# Patient Record
Sex: MALE | Race: WHITE | Employment: OTHER | URBAN - METROPOLITAN AREA
[De-identification: names, ages, dates, MRNs, and addresses within clinical notes are randomized per-mention and may not be internally consistent; named-entity substitution may affect disease eponyms.]

---

## 2017-07-16 ENCOUNTER — HOSPITAL ENCOUNTER (EMERGENCY)
Facility: CLINIC | Age: 47
Discharge: HOME | End: 2017-07-16
Attending: EMERGENCY MEDICINE | Admitting: EMERGENCY MEDICINE

## 2017-07-16 ENCOUNTER — APPOINTMENT (OUTPATIENT)
Dept: CT IMAGING | Facility: CLINIC | Age: 47
End: 2017-07-16
Attending: EMERGENCY MEDICINE

## 2017-07-16 VITALS
HEART RATE: 86 BPM | DIASTOLIC BLOOD PRESSURE: 97 MMHG | RESPIRATION RATE: 16 BRPM | WEIGHT: 174 LBS | OXYGEN SATURATION: 99 % | SYSTOLIC BLOOD PRESSURE: 167 MMHG | TEMPERATURE: 98 F | HEIGHT: 72 IN | BODY MASS INDEX: 23.57 KG/M2

## 2017-07-16 DIAGNOSIS — N20.1 CALCULUS OF URETER: ICD-10-CM

## 2017-07-16 DIAGNOSIS — R39.15 URINARY URGENCY: ICD-10-CM

## 2017-07-16 LAB
ALBUMIN UR-MCNC: 10 MG/DL
APPEARANCE UR: ABNORMAL
BILIRUB UR QL STRIP: NEGATIVE
COLOR UR AUTO: YELLOW
GLUCOSE UR STRIP-MCNC: NEGATIVE MG/DL
HGB UR QL STRIP: ABNORMAL
KETONES UR STRIP-MCNC: NEGATIVE MG/DL
LEUKOCYTE ESTERASE UR QL STRIP: NEGATIVE
MUCOUS THREADS #/AREA URNS LPF: PRESENT /LPF
NITRATE UR QL: NEGATIVE
PH UR STRIP: 7.5 PH (ref 5–7)
RBC #/AREA URNS AUTO: 20 /HPF (ref 0–2)
SP GR UR STRIP: 1.01 (ref 1–1.03)
SQUAMOUS #/AREA URNS AUTO: <1 /HPF (ref 0–1)
URN SPEC COLLECT METH UR: ABNORMAL
UROBILINOGEN UR STRIP-MCNC: NORMAL MG/DL (ref 0–2)
WBC #/AREA URNS AUTO: 0 /HPF (ref 0–2)

## 2017-07-16 PROCEDURE — 87086 URINE CULTURE/COLONY COUNT: CPT | Performed by: EMERGENCY MEDICINE

## 2017-07-16 PROCEDURE — 87591 N.GONORRHOEAE DNA AMP PROB: CPT | Performed by: EMERGENCY MEDICINE

## 2017-07-16 PROCEDURE — 87491 CHLMYD TRACH DNA AMP PROBE: CPT | Performed by: EMERGENCY MEDICINE

## 2017-07-16 PROCEDURE — 81001 URINALYSIS AUTO W/SCOPE: CPT | Performed by: EMERGENCY MEDICINE

## 2017-07-16 PROCEDURE — 74176 CT ABD & PELVIS W/O CONTRAST: CPT

## 2017-07-16 PROCEDURE — 99285 EMERGENCY DEPT VISIT HI MDM: CPT | Mod: 25

## 2017-07-16 RX ORDER — OXYCODONE AND ACETAMINOPHEN 5; 325 MG/1; MG/1
1 TABLET ORAL EVERY 4 HOURS PRN
Qty: 20 TABLET | Refills: 0 | Status: SHIPPED | OUTPATIENT
Start: 2017-07-16 | End: 2018-02-05

## 2017-07-16 RX ORDER — TAMSULOSIN HYDROCHLORIDE 0.4 MG/1
0.4 CAPSULE ORAL DAILY
Qty: 10 CAPSULE | Refills: 0 | Status: SHIPPED | OUTPATIENT
Start: 2017-07-16 | End: 2017-07-26

## 2017-07-16 RX ORDER — TAMSULOSIN HYDROCHLORIDE 0.4 MG/1
0.4 CAPSULE ORAL ONCE
Status: DISCONTINUED | OUTPATIENT
Start: 2017-07-16 | End: 2017-07-16 | Stop reason: HOSPADM

## 2017-07-16 RX ORDER — PHENAZOPYRIDINE HYDROCHLORIDE 100 MG/1
100 TABLET, FILM COATED ORAL 3 TIMES DAILY
Qty: 9 TABLET | Refills: 0 | Status: SHIPPED | OUTPATIENT
Start: 2017-07-16 | End: 2017-07-19

## 2017-07-16 RX ORDER — PHENAZOPYRIDINE HYDROCHLORIDE 100 MG/1
100 TABLET, FILM COATED ORAL ONCE
Status: DISCONTINUED | OUTPATIENT
Start: 2017-07-16 | End: 2017-07-16 | Stop reason: HOSPADM

## 2017-07-16 ASSESSMENT — ENCOUNTER SYMPTOMS
ABDOMINAL PAIN: 1
FREQUENCY: 1
FEVER: 0
DYSURIA: 1
FLANK PAIN: 0

## 2017-07-16 NOTE — ED AVS SNAPSHOT
Emergency Department    [address] [place] [address]    Phone:  [phone]    Fax:  [phone]                                       Obey Richardson   MRN: [id number]    Department:   Emergency Department   Date of Visit:  7/16/2017           After Visit Summary Signature Page     I have received my discharge instructions, and my questions have been answered. I have discussed any challenges I see with this plan with the nurse or doctor.    ..........................................................................................................................................  Patient/Patient Representative Signature      ..........................................................................................................................................  Patient Representative Print Name and Relationship to Patient    ..................................................               ................................................  Date                                            Time    ..........................................................................................................................................  Reviewed by Signature/Title    ...................................................              ..............................................  Date                                                            Time

## 2017-07-16 NOTE — ED PROVIDER NOTES
History     Chief Complaint:  Penile pain    HPI   Obey Richardson is a 46 year old male who presents to the Emergency Department for evaluation of penile pain. The patient reports the onset of penile pain two days ago with associated dysuria, frequent urination and lower abdominal pain (only present while urinating). Additionally he notes to have redness to the tip of his penis. The patient denies any fever, discharge, flank pain, testicular pain or swelling. 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 Patient state he is not currently sexually active.    Allergies:  No known drug allergies    Medications:    The patient is not currently taking any prescribed medications.    Past Medical History:    The patient denies any significant past medical history.    Past Surgical History:    The patient does not have any pertinent past surgical history.    Family History:    No past pertinent family history.    Social History:  Smoking Status: never smoker  Alcohol Use: no     Review of Systems   Constitutional: Negative for fever.   Gastrointestinal: Positive for abdominal pain.   Genitourinary: Positive for dysuria, frequency and penile pain. Negative for discharge, flank pain, scrotal swelling and testicular pain.   All other systems reviewed and are negative.    Physical Exam   First Vitals:  BP: (!) 167/97  Pulse: 86  Temp: 98  F (36.7  C)  Resp: 16  Height: 182.9 cm (6')  Weight: 78.9 kg (174 lb)  SpO2: 99 %      Physical Exam  General/Appearance: appears stated age, well-groomed, appears comfortable  Eyes: EOMI, no scleral injection, no icterus  ENT: MMM  Neck: supple, nl ROM, no stiffness  Cardiovascular: RRR, nl S1S2, no m/r/g, 2+ pulses in all 4 extremities, cap refill <2sec  Respiratory: CTAB, good air movement throughout, no wheezes/rhonchi/rales, no increased WOB, no retractions  Back: no lesions  GI: abd soft, non-distended, nttp,  no HSM, no rebound, no guarding, nl BS  : nl  bilateral testicular lie, no testicular swelling/lesions/ttp/masses. No penile lesions or purulent drainage.  MSK: CORNELIUS, good tone, no bony abnormality  Skin: warm and well-perfused, no rash, no edema, no ecchymosis, nl turgor  Neuro: GCS 15, alert and oriented, no gross focal neuro deficits  Psych: interacts appropriately  Heme: no petechia, no purpura, no active bleeding        Emergency Department Course     Imaging:  Radiographic findings were communicated with the patient who voiced understanding of the findings.    ABD/Pelvis CT without contrast:  There are two obstructing calculi in the distal left  ureter near the ureterovesical junction measuring 0.6 and 0.2 cm  respectively. This results in moderate hydronephrosis. There are  additional small non obstructing calculi elsewhere in both kidneys. As per radiology.     Laboratory:  UA reflex to Microscopic: trace urine blood, pH 7.5(H), protein albumin 10, RBC 20(H), mucous present, o/w WNL.    Neisseria gonorrhoea: In process  Chlamydia trachomatis: In process    Emergency Department Course:  Nursing notes and vitals reviewed. I performed an exam of the patient as documented above.     The patient provided a urine sample here in the emergency department. This was sent for laboratory testing, findings above.    The patient had a genital exam performed here in the emergency department, which he tolerated without difficulty. This was done in the presence of a male chaperone.    1307 I reassessed the patient. Will do a stone CT.     The patient was sent for a abd/pelvis ct no contrast while here in the emergency department, findings above.    1420 I reassessed the patient. Discussed results of CT imaging.     Findings and plan explained to the Patient. Patient discharged home with instructions regarding supportive care, medications, and reasons to return. The importance of close follow-up was reviewed.     Impression & Plan      Medical Decision Making:  This  patient is a 46-year-old male who presents for several days of urinary symptoms. He denies flank pain, nausea vomiting, fevers, other infectious signs. As his urinalysis did not show any signs of infection we pursued a CT to look for possible stones that were causing pain that radiated to the groin. His CT did find 2 stones near the left UVJ junction that both explain his hematuria and I expect explain his dysuria as well as urethral meatus pain. It's possible that he is already passed a stone that initially caused the redness and meatus pain. At this point a urine culture has been sent but given the lack of other concerning findings I don't think antibiotics need to be started. He'll be started on Flomax, pain meds, Pyridium to help him control his symptoms associated with the stones. We discussed his need to return to the ED if he develops fevers, worsening pain, inability to tolerate the pain. Otherwise he needs to strain his urine and follow up with urology in 3-5 days if he still having symptoms and has not passed the stones.  Diagnosis:    ICD-10-CM    1. Calculus of ureter N20.1 Urine Culture Aerobic Bacterial   2. Urinary urgency R39.15      Disposition:  discharged to home  Discharge Medications:  Discharge Medication List as of 7/16/2017  2:36 PM      START taking these medications    Details   tamsulosin (FLOMAX) 0.4 MG capsule Take 1 capsule (0.4 mg) by mouth daily for 10 doses, Disp-10 capsule, R-0, Local Print      oxyCODONE-acetaminophen (PERCOCET) 5-325 MG per tablet Take 1 tablet by mouth every 4 hours as needed for pain, Disp-20 tablet, R-0, Local Print      phenazopyridine (PYRIDIUM) 100 MG tablet Take 1 tablet (100 mg) by mouth 3 times daily for 3 days, Disp-9 tablet, R-0, Local Print             Aliza GARCIA, am serving as a scribe on 7/16/2017 at 12:55 PM to personally document services performed by Miriam Cross* based on my observations and the provider's statements to me.      Aliza Rivera  7/16/2017    EMERGENCY DEPARTMENT       Miriam Cross MD  07/16/17 5111

## 2017-07-16 NOTE — ED AVS SNAPSHOT
Emergency Department    [address] [facility] [address]    Phone:  [phone]    Fax:  [phone]                                       Obey Richardson   MRN: [id number]    Department:   Emergency Department   Date of Visit:  7/16/2017           Patient Information     Date Of Birth          1970        Your diagnoses for this visit were:     Calculus of ureter     Urinary urgency        You were seen by Miriam Cross MD.      Follow-up Information     Follow up with [facility]    Why:  As needed, If symptoms worsen    Contact information:    [address]  [facility] [id number] [phone]          Follow up with  Emergency Department.    Specialty:  EMERGENCY MEDICINE    Why:  As needed, If symptoms worsen    Contact information:    [address] [facility] [address] [phone]        Discharge Instructions         Discharge Instructions  Kidney Stones    Kidney stones are a common problem that can cause a lot of pain but fortunately are usually not dangerous and can be generally treated with medicine at home.  However, sometimes your condition may be worse than it seemed at first, or may get worse with time.     You need to follow-up with your regular doctor within 3 days.    Most kidney stones will pass on their own, but occasionally stones may need to be removed by an urologist. We will send you home with a urine strainer. Be sure to urinate into this, or urinate into a container and pour the urine through the fine filter to catch the kidney stone as it comes out. The stone will seem like a pebble or grain of sand. Be sure to save this in a Ziploc  bag and take it to the doctor s office with you.       Return to the Emergency Department if:    Your pain is not controlled.    You are vomiting and can t keep fluids or medications down.    You develop fever (>101).    You feel much more ill or develop new symptoms.  What can  I do to help myself?    Be sure to drink plenty of fluids.    Staying active is good, and may help the stone to pass. You may do whatever you feel up to doing without restrictions.   Treatment:    Non-steroidal anti-inflammatory drugs (NSAIDs). This includes prescription medicines like Toradol  (ketorolac) and non-prescription medicines like Advil  (ibuprofen) and Nuprin  (ibuprofen). These pain relievers are very effective for kidney stones.    Narcotic pain pills. If you have been given a narcotic such as Vicodin  (hydrocodone with acetaminophen), Percocet  (oxycodone with acetaminophen), or codeine, do not drive for four hours after you have taken it. If the narcotic contains Tylenol  (acetaminophen), do not take Tylenol  with it. All narcotics will cause constipation, so eat a high fiber diet.      Nausea medication.  Nausea and vomiting are common with kidney stones, so your physician may send you home with medicine for this.     Flomax  (tamsulosin). This medicine is sometimes used for men with prostate problems, but also can help kidney stones to pass. This medicine can lower blood pressure, and you may feel faint, especially when you first stand up. Be sure to get up gradually, sit down if you feel faint, and avoid activity where feeling faint would be dangerous, such as climbing ladders.   If you were given a prescription for medicine here today, be sure to read all of the information (including the package insert) that comes with your prescription.  This will include important information about the medicine, its side effects, and any warnings that you need to know about.  The pharmacist who fills the prescription can provide more information and answer questions you may have about the medicine.  If you have questions or concerns that the pharmacist cannot address, please call or return to the Emergency Department.   Opioid Medication Information    Pain medications are among the most commonly prescribed  medicines, so we are including this information for all our patients. If you did not receive pain medication or get a prescription for pain medicine, you can ignore it.     You may have been given a prescription for an opioid (narcotic) pain medicine and/or have received a pain medicine while here in the Emergency Department. These medicines can make you drowsy or impaired. You must not drive, operate dangerous equipment, or engage in any other dangerous activities while taking these medications. If you drive while taking these medications, you could be arrested for DUI, or driving under the influence. Do not drink any alcohol while you are taking these medications.     Opioid pain medications can cause addiction. If you have a history of chemical dependency of any type, you are at a higher risk of becoming addicted to pain medications.  Only take these prescribed medications to treat your pain when all other options have been tried. Take it for as short a time and as few doses as possible. Store your pain pills in a secure place, as they are frequently stolen and provide a dangerous opportunity for children or visitors in your house to start abusing these powerful medications. We will not replace any lost or stolen medicine.  As soon as your pain is better, you should flush all your remaining medication.     Many prescription pain medications contain Tylenol  (acetaminophen), including Vicodin , Tylenol #3 , Norco , Lortab , and Percocet .  You should not take any extra pills of Tylenol  if you are using these prescription medications or you can get very sick.  Do not ever take more than 3000 mg of acetaminophen in any 24 hour period.    All opioids tend to cause constipation. Drink plenty of water and eat foods that have a lot of fiber, such as fruits, vegetables, prune juice, apple juice and high fiber cereal.  Take a laxative if you don t move your bowels at least every other day. Miralax , Milk of Magnesia,  Colace , or Senna  can be used to keep you regular.      Remember that you can always come back to the Emergency Department if you are not able to see your regular doctor in the amount of time listed above, if you get any new symptoms, or if there is anything that worries you.        24 Hour Appointment Hotline       To make an appointment at any [facility], call [phone] ([phone]). If you don't have a family doctor or clinic, we will help you find one. [facility] are conveniently located to serve the needs of you and your family.             Review of your medicines      START taking        Dose / Directions Last dose taken    oxyCODONE-acetaminophen 5-325 MG per tablet   Commonly known as:  PERCOCET   Dose:  1 tablet   Quantity:  20 tablet        Take 1 tablet by mouth every 4 hours as needed for pain   Refills:  0        phenazopyridine 100 MG tablet   Commonly known as:  PYRIDIUM   Dose:  100 mg   Quantity:  9 tablet        Take 1 tablet (100 mg) by mouth 3 times daily for 3 days   Refills:  0        tamsulosin 0.4 MG capsule   Commonly known as:  FLOMAX   Dose:  0.4 mg   Quantity:  10 capsule        Take 1 capsule (0.4 mg) by mouth daily for 10 doses   Refills:  0                Prescriptions were sent or printed at these locations (3 Prescriptions)                   Other Prescriptions                Printed at Department/Unit printer (3 of 3)         tamsulosin (FLOMAX) 0.4 MG capsule               oxyCODONE-acetaminophen (PERCOCET) 5-325 MG per tablet               phenazopyridine (PYRIDIUM) 100 MG tablet                Procedures and tests performed during your visit     Abd/pelvis CT no contrast - Stone Protocol    Chlamydia trachomatis PCR    Neisseria gonorrhoea PCR    UA reflex to Microscopic      Orders Needing Specimen Collection     None      Pending Results     Date and Time Order Name Status Description    7/16/2017 1250 Chlamydia trachomatis PCR In process     7/16/2017  1250 Neisseria gonorrhoea PCR In process             Pending Culture Results     Date and Time Order Name Status Description    7/16/2017 1250 Chlamydia trachomatis PCR In process     7/16/2017 1250 Neisseria gonorrhoea PCR In process             Pending Results Instructions     If you had any lab results that were not finalized at the time of your Discharge, you can call the ED Lab Result RN at [phone]. You will be contacted by this team for any positive Lab results or changes in treatment. The nurses are available 7 days a week from 10A to 6:30P.  You can leave a message 24 hours per day and they will return your call.        Test Results From Your Hospital Stay        7/16/2017 12:56 PM      Component Results     Component Value Ref Range & Units Status    Color Urine Yellow  Final    Appearance Urine Slightly Cloudy  Final    Glucose Urine Negative NEG mg/dL Final    Bilirubin Urine Negative NEG Final    Ketones Urine Negative NEG mg/dL Final    Specific Gravity Urine 1.015 1.003 - 1.035 Final    Blood Urine Trace (A) NEG Final    pH Urine 7.5 (H) 5.0 - 7.0 pH Final    Protein Albumin Urine 10 (A) NEG mg/dL Final    Urobilinogen mg/dL Normal 0.0 - 2.0 mg/dL Final    Nitrite Urine Negative NEG Final    Leukocyte Esterase Urine Negative NEG Final    Source Midstream Urine  Final    RBC Urine 20 (H) 0 - 2 /HPF Final    WBC Urine 0 0 - 2 /HPF Final    Squamous Epithelial /HPF Urine <1 0 - 1 /HPF Final    Mucous Urine Present (A) NEG /LPF Final         7/16/2017  1:30 PM         7/16/2017  1:30 PM         7/16/2017  2:00 PM      Narrative     CT ABDOMEN AND PELVIS WITHOUT CONTRAST  7/16/2017 1:44 PM    HISTORY: Pain with hematuria. The concern is for a urinary tract  calculus.    COMPARISON: None.    TECHNIQUE: Routine transverse CT imaging of the abdomen and pelvis was  performed without contrast. Radiation dose for this scan was reduced  using automated exposure control, adjustment of the mA and/or  kV  according to patient size, or iterative reconstruction technique.    FINDINGS: The visualized lung bases are clear. There are numerous  nonobstructing calculi in both kidneys. There are also two adjacent  obstructing calculi in the distal left ureter near the ureterovesical  junction. The more distal calculus measures 0.6 cm and the adjacent  more proximal calculus measures 0.2 cm. This results in moderate left  hydronephrosis and hydroureter. No right-sided obstruction is seen. No  other urinary tract abnormality is noted. The liver, spleen, pancreas,  gallbladder, and adrenal glands are normal allowing for the absence of  contrast. No enlarged lymph node or other abnormal mass is  demonstrated. No free fluid is seen. No free intraperitoneal gas is  identified. The gastrointestinal tract is unremarkable. There is a  normal-appearing appendix. No vascular abnormality is seen. The  osseous structures are unremarkable. No abdominal or pelvic wall  pathology is demonstrated.         Impression     IMPRESSION: There are two obstructing calculi in the distal left  ureter near the ureterovesical junction measuring 0.6 and 0.2 cm  respectively. This results in moderate hydronephrosis. There are  additional small nonobstructing calculi elsewhere in both kidneys.    MALENA DUONG MD                Clinical Quality Measure: Blood Pressure Screening     Your blood pressure was checked while you were in the emergency department today. The last reading we obtained was  BP: (!) 167/97 . Please read the guidelines below about what these numbers mean and what you should do about them.  If your systolic blood pressure (the top number) is less than 120 and your diastolic blood pressure (the bottom number) is less than 80, then your blood pressure is normal. There is nothing more that you need to do about it.  If your systolic blood pressure (the top number) is 120-139 or your diastolic blood pressure (the bottom number) is  "80-89, your blood pressure may be higher than it should be. You should have your blood pressure rechecked within a year by a primary care provider.  If your systolic blood pressure (the top number) is 140 or greater or your diastolic blood pressure (the bottom number) is 90 or greater, you may have high blood pressure. High blood pressure is treatable, but if left untreated over time it can put you at risk for heart attack, stroke, or kidney failure. You should have your blood pressure rechecked by a primary care provider within the next 4 weeks.  If your provider in the emergency department today gave you specific instructions to follow-up with your doctor or provider even sooner than that, you should follow that instruction and not wait for up to 4 weeks for your follow-up visit.        Thank you for choosing [facility]       Thank you for choosing [facility] for your care. Our goal is always to provide you with excellent care. Hearing back from our patients is one way we can continue to improve our services. Please take a few minutes to complete the written survey that you may receive in the mail after you visit with us. Thank you!        [employer] Information     [employer] lets you send messages to your doctor, view your test results, renew your prescriptions, schedule appointments and more. To sign up, go to [email or web address] . Click on \"Log in\" on the left side of the screen, which will take you to the Welcome page. Then click on \"Sign up Now\" on the right side of the page.     You will be asked to enter the access code listed below, as well as some personal information. Please follow the directions to create your username and password.     Your access code is: [email or web address]  Expires: 10/14/2017  2:36 PM     Your access code will  in 90 days. If you need help or a new code, please call your [facility] clinic or [phone].        Care EveryWhere ID     This is your Care EveryWhere ID. This could be used by " other organizations to access your [facility] medical records  [id number]        Equal Access to Services     PATIENCE MARTINEZ : Nhung Hope, john kaminski, eleno mujica. So [facility] [phone].    ATENCIÓN: Si habla español, tiene a orosco disposición servicios gratuitos de asistencia lingüística. Llame al [phone].    We comply with applicable federal civil rights laws and [place] laws. We do not discriminate on the basis of race, color, national origin, age, disability sex, sexual orientation or gender identity.            After Visit Summary       This is your record. Keep this with you and show to your community pharmacist(s) and doctor(s) at your next visit.

## 2017-07-16 NOTE — DISCHARGE INSTRUCTIONS
Discharge Instructions  Kidney Stones    Kidney stones are a common problem that can cause a lot of pain but fortunately are usually not dangerous and can be generally treated with medicine at home.  However, sometimes your condition may be worse than it seemed at first, or may get worse with time.     You need to follow-up with your regular doctor within 3 days.    Most kidney stones will pass on their own, but occasionally stones may need to be removed by an urologist. We will send you home with a urine strainer. Be sure to urinate into this, or urinate into a container and pour the urine through the fine filter to catch the kidney stone as it comes out. The stone will seem like a pebble or grain of sand. Be sure to save this in a Ziploc  bag and take it to the doctor s office with you.       Return to the Emergency Department if:    Your pain is not controlled.    You are vomiting and can t keep fluids or medications down.    You develop fever (>101).    You feel much more ill or develop new symptoms.  What can I do to help myself?    Be sure to drink plenty of fluids.    Staying active is good, and may help the stone to pass. You may do whatever you feel up to doing without restrictions.   Treatment:    Non-steroidal anti-inflammatory drugs (NSAIDs). This includes prescription medicines like Toradol  (ketorolac) and non-prescription medicines like Advil  (ibuprofen) and Nuprin  (ibuprofen). These pain relievers are very effective for kidney stones.    Narcotic pain pills. If you have been given a narcotic such as Vicodin  (hydrocodone with acetaminophen), Percocet  (oxycodone with acetaminophen), or codeine, do not drive for four hours after you have taken it. If the narcotic contains Tylenol  (acetaminophen), do not take Tylenol  with it. All narcotics will cause constipation, so eat a high fiber diet.      Nausea medication.  Nausea and vomiting are common with kidney stones, so your physician may send you  home with medicine for this.     Flomax  (tamsulosin). This medicine is sometimes used for men with prostate problems, but also can help kidney stones to pass. This medicine can lower blood pressure, and you may feel faint, especially when you first stand up. Be sure to get up gradually, sit down if you feel faint, and avoid activity where feeling faint would be dangerous, such as climbing ladders.   If you were given a prescription for medicine here today, be sure to read all of the information (including the package insert) that comes with your prescription.  This will include important information about the medicine, its side effects, and any warnings that you need to know about.  The pharmacist who fills the prescription can provide more information and answer questions you may have about the medicine.  If you have questions or concerns that the pharmacist cannot address, please call or return to the Emergency Department.   Opioid Medication Information    Pain medications are among the most commonly prescribed medicines, so we are including this information for all our patients. If you did not receive pain medication or get a prescription for pain medicine, you can ignore it.     You may have been given a prescription for an opioid (narcotic) pain medicine and/or have received a pain medicine while here in the Emergency Department. These medicines can make you drowsy or impaired. You must not drive, operate dangerous equipment, or engage in any other dangerous activities while taking these medications. If you drive while taking these medications, you could be arrested for DUI, or driving under the influence. Do not drink any alcohol while you are taking these medications.     Opioid pain medications can cause addiction. If you have a history of chemical dependency of any type, you are at a higher risk of becoming addicted to pain medications.  Only take these prescribed medications to treat your pain when all  other options have been tried. Take it for as short a time and as few doses as possible. Store your pain pills in a secure place, as they are frequently stolen and provide a dangerous opportunity for children or visitors in your house to start abusing these powerful medications. We will not replace any lost or stolen medicine.  As soon as your pain is better, you should flush all your remaining medication.     Many prescription pain medications contain Tylenol  (acetaminophen), including Vicodin , Tylenol #3 , Norco , Lortab , and Percocet .  You should not take any extra pills of Tylenol  if you are using these prescription medications or you can get very sick.  Do not ever take more than 3000 mg of acetaminophen in any 24 hour period.    All opioids tend to cause constipation. Drink plenty of water and eat foods that have a lot of fiber, such as fruits, vegetables, prune juice, apple juice and high fiber cereal.  Take a laxative if you don t move your bowels at least every other day. Miralax , Milk of Magnesia, Colace , or Senna  can be used to keep you regular.      Remember that you can always come back to the Emergency Department if you are not able to see your regular doctor in the amount of time listed above, if you get any new symptoms, or if there is anything that worries you.

## 2017-07-17 LAB
BACTERIA SPEC CULT: NO GROWTH
C TRACH DNA SPEC QL NAA+PROBE: NORMAL
Lab: NORMAL
MICRO REPORT STATUS: NORMAL
N GONORRHOEA DNA SPEC QL NAA+PROBE: NORMAL
SPECIMEN SOURCE: NORMAL

## 2018-02-05 ENCOUNTER — HOSPITAL ENCOUNTER (EMERGENCY)
Facility: CLINIC | Age: 48
Discharge: HOME | End: 2018-02-05
Attending: EMERGENCY MEDICINE | Admitting: EMERGENCY MEDICINE
Payer: COMMERCIAL

## 2018-02-05 VITALS
BODY MASS INDEX: 24.38 KG/M2 | DIASTOLIC BLOOD PRESSURE: 104 MMHG | TEMPERATURE: 98.4 F | HEART RATE: 84 BPM | RESPIRATION RATE: 18 BRPM | SYSTOLIC BLOOD PRESSURE: 153 MMHG | HEIGHT: 72 IN | WEIGHT: 180 LBS | OXYGEN SATURATION: 99 %

## 2018-02-05 DIAGNOSIS — H65.93 MIDDLE EAR EFFUSION, BILATERAL: ICD-10-CM

## 2018-02-05 DIAGNOSIS — H93.13 TINNITUS, BILATERAL: ICD-10-CM

## 2018-02-05 PROCEDURE — 99282 EMERGENCY DEPT VISIT SF MDM: CPT

## 2018-02-05 RX ORDER — FLUTICASONE PROPIONATE 50 MCG
1-2 SPRAY, SUSPENSION (ML) NASAL DAILY
Qty: 1 BOTTLE | Refills: 11 | Status: SHIPPED | OUTPATIENT
Start: 2018-02-05 | End: 2020-04-01

## 2018-02-05 ASSESSMENT — ENCOUNTER SYMPTOMS: SINUS PRESSURE: 1

## 2018-02-05 NOTE — ED AVS SNAPSHOT
Emergency Department    [address] [facility] [address]    Phone:  [phone]    Fax:  [phone]                                       Obey Richardson   MRN: [id number]    Department:   Emergency Department   Date of Visit:  2/5/2018           Patient Information     Date Of Birth          1970        Your diagnoses for this visit were:     Tinnitus, bilateral     Middle ear effusion, bilateral        You were seen by Desirae Gomez MD.      Follow-up Information     Follow up with Gold Grant MD. Schedule an appointment as soon as possible for a visit in 2 weeks.    Specialty:  Otolaryngology    Why:  As needed    Contact information:    [facility]  [address] [address]  [phone]          Follow up with  Emergency Department.    Specialty:  EMERGENCY MEDICINE    Why:  If symptoms worsen    Contact information:    [address] [facility] [id number] [phone]        Discharge Instructions         Common Middle Ear Problems    Your middle ear may have been injured or infected recently. Over time, certain growths or bone disease can also harm the middle ear. Left untreated, middle ear problems often lead to lifelong hearing loss. There are two types of hearing loss: conductive and sensorineural. One or both kinds can occur. Injury, infection, certain growths, or bone disease can cause your symptoms. A ruptured eardrum or a long-lasting (chronic) ear infection may be painful and decrease hearing.  Symptoms    Hearing loss in one or both ears    Fluid, often smelly, draining from the ear    Pain, pressure, or discomfort in the ear    Ringing in the ear  Conductive and sensorineural hearing loss  Sound waves may be disrupted before they reach the inner ear. If this happens, conductive hearing loss may occur. The ear canal can be blocked by wax, infection, a tumor, or a foreign object. The eardrum can be injured or  infected. Abnormal bone growth, infection, or tumors in the middle ear can block sound waves.  Sound waves may not be processed correctly in the inner ear. If this happens, sensorineural hearing loss may occur. Permanent hearing loss is most commonly associated with sensorineural problems.  The tests and evaluations used to diagnose what type of hearing problem you have will depend on your symptoms.   Date Last Reviewed: 10/1/2016    5810-5464 [employer]. [address] [place] [address], [place] [address]. All rights reserved. This information is not intended as a substitute for professional medical care. Always follow your healthcare professional's instructions.          Tinnitus (Ringing in the Ears)     Treatment may include maskers and hearing aids.     Tinnitus is the term for a noise in your ear not caused by an outside sound. The noise might be a ringing, clicking, hiss, or roar. It can vary in pitch and may be soft or quite loud. For some people, tinnitus is a minor nuisance. But for others, the noise can make it hard to hear, work, and even sleep. When tinnitus can't be cured, a number of treatments may offer relief.  What causes tinnitus?  Loud noises, hearing loss, and ear wax can cause tinnitus. So can certain medicines. Large amounts of aspirin or caffeine are sometimes to blame. In many cases, the exact cause of tinnitus is unknown.  How is tinnitus treated?  Identifying and removing the cause is the best way to treat tinnitus. For that reason, your healthcare provider may refer you to an otolaryngologist (ear, nose, and throat doctor). Your hearing may also be checked by an audiologist (hearing specialist). If you have hearing loss, wearing a hearing aid may help your tinnitus. When the cause can't be found, the tinnitus itself may be treated. Some of the treatments are listed below, and your healthcare provider can tell you more about them:    Maskers are small devices that look like hearing  aids. They emit a pleasant sound that helps cover up the ringing in your ears. Hearing aids and maskers are sometimes used together.    Medicines that treat anxiety and depression may ease tinnitus in some people.    Hypnosis or relaxation therapy may help head noise seem less severe.    Tinnitus retraining therapy combines counseling and maskers. Both can help take your mind off the tinnitus.  For more information    American Speech-Hearing-Language Association [phone] www.thuan.org    American Tinnitus Association [phone] www.paulette.org    National [place] on Deafness and other Communication Disorders 003-471-8440 www.nidcd.nih.gov   Date Last Reviewed: 7/1/2016 2000-2017 [employer]. [address] [place] [address], [place] [address]. All rights reserved. This information is not intended as a substitute for professional medical care. Always follow your healthcare professional's instructions.          24 Hour Appointment Hotline       To make an appointment at any [facility], call [phone] ([phone]). If you don't have a family doctor or clinic, we will help you find one. [facility] clinics are conveniently located to serve the needs of you and your family.             Review of your medicines      START taking        Dose / Directions Last dose taken    fluticasone 50 MCG/ACT spray   Commonly known as:  FLONASE   Dose:  1-2 spray   Quantity:  1 Bottle        Spray 1-2 sprays into both nostrils daily   Refills:  11                Prescriptions were sent or printed at these locations (1 Prescription)                   Other Prescriptions                Printed at Department/Unit printer (1 of 1)         fluticasone (FLONASE) 50 MCG/ACT spray                Orders Needing Specimen Collection     None      Pending Results     No orders found from 2/3/2018 to 2/6/2018.            Pending Culture Results     No orders found from 2/3/2018 to 2/6/2018.            Pending Results  Instructions     If you had any lab results that were not finalized at the time of your Discharge, you can call the ED Lab Result RN at [phone]. You will be contacted by this team for any positive Lab results or changes in treatment. The nurses are available 7 days a week from 10A to 6:30P.  You can leave a message 24 hours per day and they will return your call.        Test Results From Your Hospital Stay               Clinical Quality Measure: Blood Pressure Screening     Your blood pressure was checked while you were in the emergency department today. The last reading we obtained was  BP: (!) 153/104 . Please read the guidelines below about what these numbers mean and what you should do about them.  If your systolic blood pressure (the top number) is less than 120 and your diastolic blood pressure (the bottom number) is less than 80, then your blood pressure is normal. There is nothing more that you need to do about it.  If your systolic blood pressure (the top number) is 120-139 or your diastolic blood pressure (the bottom number) is 80-89, your blood pressure may be higher than it should be. You should have your blood pressure rechecked within a year by a primary care provider.  If your systolic blood pressure (the top number) is 140 or greater or your diastolic blood pressure (the bottom number) is 90 or greater, you may have high blood pressure. High blood pressure is treatable, but if left untreated over time it can put you at risk for heart attack, stroke, or kidney failure. You should have your blood pressure rechecked by a primary care provider within the next 4 weeks.  If your provider in the emergency department today gave you specific instructions to follow-up with your doctor or provider even sooner than that, you should follow that instruction and not wait for up to 4 weeks for your follow-up visit.        Thank you for choosing [facility]       Thank you for choosing [facility] for your care. Our  "goal is always to provide you with excellent care. Hearing back from our patients is one way we can continue to improve our services. Please take a few minutes to complete the written survey that you may receive in the mail after you visit with us. Thank you!        Offsite Care Resources Information     [facility] lets you send messages to your doctor, view your test results, renew your prescriptions, schedule appointments and more. To sign up, go to www.[facility]/[facility] . Click on \"Log in\" on the left side of the screen, which will take you to the Welcome page. Then click on \"Sign up Now\" on the right side of the page.     You will be asked to enter the access code listed below, as well as some personal information. Please follow the directions to create your username and password.     Your access code is: [email or web address]  Expires: 2018 10:05 AM     Your access code will  in 90 days. If you need help or a new code, please call your [facility] clinic or [phone].        Care EveryWhere ID     This is your Care EveryWhere ID. This could be used by other organizations to access your [facility] medical records  [id number]        Equal Access to Services     PATIENCE MARTINEZ : Nhung Hope, john kaminski, ilene telles, eleno harrison. So [facility] [phone].    ATENCIÓN: Si habla español, tiene a orosco disposición servicios gratuitos de asistencia lingüística. Vic al [phone].    We comply with applicable federal civil rights laws and [place] laws. We do not discriminate on the basis of race, color, national origin, age, disability, sex, sexual orientation, or gender identity.            After Visit Summary       This is your record. Keep this with you and show to your community pharmacist(s) and doctor(s) at your next visit.                  "

## 2018-02-05 NOTE — DISCHARGE INSTRUCTIONS
Common Middle Ear Problems    Your middle ear may have been injured or infected recently. Over time, certain growths or bone disease can also harm the middle ear. Left untreated, middle ear problems often lead to lifelong hearing loss. There are two types of hearing loss: conductive and sensorineural. One or both kinds can occur. Injury, infection, certain growths, or bone disease can cause your symptoms. A ruptured eardrum or a long-lasting (chronic) ear infection may be painful and decrease hearing.  Symptoms    Hearing loss in one or both ears    Fluid, often smelly, draining from the ear    Pain, pressure, or discomfort in the ear    Ringing in the ear  Conductive and sensorineural hearing loss  Sound waves may be disrupted before they reach the inner ear. If this happens, conductive hearing loss may occur. The ear canal can be blocked by wax, infection, a tumor, or a foreign object. The eardrum can be injured or infected. Abnormal bone growth, infection, or tumors in the middle ear can block sound waves.  Sound waves may not be processed correctly in the inner ear. If this happens, sensorineural hearing loss may occur. Permanent hearing loss is most commonly associated with sensorineural problems.  The tests and evaluations used to diagnose what type of hearing problem you have will depend on your symptoms.   Date Last Reviewed: 10/1/2016    1079-9053 [employer]. [address] [place] [address], [place] [address]. All rights reserved. This information is not intended as a substitute for professional medical care. Always follow your healthcare professional's instructions.          Tinnitus (Ringing in the Ears)     Treatment may include maskers and hearing aids.     Tinnitus is the term for a noise in your ear not caused by an outside sound. The noise might be a ringing, clicking, hiss, or roar. It can vary in pitch and may be soft or quite loud. For some people, tinnitus is a minor nuisance. But for  others, the noise can make it hard to hear, work, and even sleep. When tinnitus can't be cured, a number of treatments may offer relief.  What causes tinnitus?  Loud noises, hearing loss, and ear wax can cause tinnitus. So can certain medicines. Large amounts of aspirin or caffeine are sometimes to blame. In many cases, the exact cause of tinnitus is unknown.  How is tinnitus treated?  Identifying and removing the cause is the best way to treat tinnitus. For that reason, your healthcare provider may refer you to an otolaryngologist (ear, nose, and throat doctor). Your hearing may also be checked by an audiologist (hearing specialist). If you have hearing loss, wearing a hearing aid may help your tinnitus. When the cause can't be found, the tinnitus itself may be treated. Some of the treatments are listed below, and your healthcare provider can tell you more about them:    Maskers are small devices that look like hearing aids. They emit a pleasant sound that helps cover up the ringing in your ears. Hearing aids and maskers are sometimes used together.    Medicines that treat anxiety and depression may ease tinnitus in some people.    Hypnosis or relaxation therapy may help head noise seem less severe.    Tinnitus retraining therapy combines counseling and maskers. Both can help take your mind off the tinnitus.  For more information    American Speech-Hearing-Language Association [phone] www.thuan.org    American Tinnitus Association [phone] www.paulette.org    National [place] on Deafness and other Communication Disorders 626-860-5244 www.nidcd.nih.gov   Date Last Reviewed: 7/1/2016 2000-2017 [employer]. [address] [place] [address], [place] [address]. All rights reserved. This information is not intended as a substitute for professional medical care. Always follow your healthcare professional's instructions.

## 2018-02-05 NOTE — ED AVS SNAPSHOT
Emergency Department    [address] [place] [address]    Phone:  [phone]    Fax:  [phone]                                       Obey Richardson   MRN: [id number]    Department:   Emergency Department   Date of Visit:  2/5/2018           After Visit Summary Signature Page     I have received my discharge instructions, and my questions have been answered. I have discussed any challenges I see with this plan with the nurse or doctor.    ..........................................................................................................................................  Patient/Patient Representative Signature      ..........................................................................................................................................  Patient Representative Print Name and Relationship to Patient    ..................................................               ................................................  Date                                            Time    ..........................................................................................................................................  Reviewed by Signature/Title    ...................................................              ..............................................  Date                                                            Time

## 2018-02-05 NOTE — ED PROVIDER NOTES
"  History     Chief Complaint:  Tinnitus     The history is provided by the patient.      Obey Richardson is a 47 year old male who presents to the ED for evaluation of tinnitus. The patient reports a sudden onset of bilateral tinnitus on 02/03 that relieved itself that night. He felt fine until last night, when his bilateral tinnitus began after he tried to \"clear his ears\" by blowing with a plugged nose and has continued without relief.     Here in the ED, the patient admits to having nasal and ear congestion. He denies any recent falls or injuries, vertigo, usage of q tips, taking aspirin or other medications within the last week, or taking allergy medications.     Allergies:  NKDA     Medications:    The patient is currently on no regular medications.    Past Medical History:    The patient denies any significant past medical history.    Past Surgical History:    The patient does not have any pertinent past surgical history.    Family History:    No past pertinent family history.    Social History:  Presents alone.  Negative for tobacco use.  Negative for alcohol use.  Marital Status:  Single [1]    Review of Systems   HENT: Positive for congestion, sinus pressure and tinnitus.    All other systems reviewed and are negative.    Physical Exam   First Vitals:  Patient Vitals for the past 24 hrs:   BP Temp Temp src Pulse Resp SpO2 Height Weight   02/05/18 1009 - - - - 18 - - -   02/05/18 0944 (!) 153/104 98.4  F (36.9  C) Oral 84 18 99 % 1.829 m (6') 81.6 kg (180 lb)       Physical Exam  General: Resting on the gurney, appears comfortable  Head:  The scalp, face, and head appear normal.   Nose:   Slight nasal congestion.  Ears:  Bilateral, clear effusions with no redness or purulence. No mastoid tenderness. No evidence of otitis externa.   Mouth/Throat: Mucus membranes are moist.   CV:  Regular rate    Normal S1 and S2  No pathological murmur   Resp:  Breath sounds clear and equal bilaterally    Non-labored, no " retractions or accessory muscle use    No coarseness    No wheezing   GI:  Abdomen is soft, no rigidity    No tenderness to palpation  MS:  Normal motor assessment of all extremities.    Good capillary refill noted.  Skin:   No rash or lesions noted.  Neuro:   Speech is normal and fluent. No apparent deficit.  Psych: Awake. Alert.  Normal affect.      Appropriate interactions.    Emergency Department Course   Emergency Department Course:  Nursing notes and vitals reviewed. 0946 I performed an exam of the patient as documented above.     Findings and plan explained to the Patient. Patient discharged home with instructions regarding supportive care, medications, and reasons to return. The importance of close follow-up was reviewed. The patient was prescribed Flonase.     I personally reviewed the laboratory results with the Patient and answered all related questions prior to discharge.     Impression & Plan    Medical Decision Making:  Obey Richardson is a 47 year old male who presents to the ED complaining of bilateral tinnitus. Patient has had a full sensation in his ears, secondary to his URI. Attempted to equalize his ears by closed glotic nose plugged blowing and has had ringing ears since that time. He does have bilateral effusions without sign of infection. I recommend the patient start a nasal steroid given his significant nasal congestion and taking an anti-inflammatory medication and also discussed other causes of tinnitus. I recommended follow up with ENT if he does not improve in several days. I did specifically ask the patient about aspirin use. He has not been taking any aspirin in the last week and has no other current complaints.       Diagnosis:    ICD-10-CM   1. Tinnitus, bilateral H93.13   2. Middle ear effusion, bilateral H65.93       Disposition:  discharged to home    Discharge Medications:  Discharge Medication List as of 2/5/2018 10:05 AM      START taking these medications    Details    fluticasone (FLONASE) 50 MCG/ACT spray Spray 1-2 sprays into both nostrils daily, Disp-1 Bottle, R-11, Local Print           I, Nataliia Santiago, am serving as a scribe on 2/5/2018 at 9:46 AM to personally document services performed by Desirae Gomez MD based on my observations and the provider's statements to me.       Nataliia Santiago  2/5/2018    EMERGENCY DEPARTMENT       Desirae Gomez MD  02/06/18 [id number]

## 2020-04-01 ENCOUNTER — OFFICE VISIT (OUTPATIENT)
Dept: FAMILY MEDICINE | Facility: CLINIC | Age: 50
End: 2020-04-01

## 2020-04-01 VITALS
RESPIRATION RATE: 16 BRPM | DIASTOLIC BLOOD PRESSURE: 72 MMHG | BODY MASS INDEX: 21.02 KG/M2 | HEART RATE: 64 BPM | SYSTOLIC BLOOD PRESSURE: 124 MMHG | TEMPERATURE: 97.6 F | WEIGHT: 155.2 LBS | HEIGHT: 72 IN

## 2020-04-01 DIAGNOSIS — L02.219 ABSCESS, TRUNK: ICD-10-CM

## 2020-04-01 DIAGNOSIS — Z00.00 HEALTH CARE MAINTENANCE: Primary | ICD-10-CM

## 2020-04-01 LAB
% GRANULOCYTES: 73.7 % (ref 42.2–75.2)
HCT VFR BLD AUTO: 47.9 % (ref 39–51)
HEMOGLOBIN: 16.5 G/DL (ref 13.4–17.5)
LYMPHOCYTES NFR BLD AUTO: 20.9 % (ref 20.5–51.1)
MCH RBC QN AUTO: 29.2 PG (ref 27–31)
MCHC RBC AUTO-ENTMCNC: 34.5 G/DL (ref 33–37)
MCV RBC AUTO: 84.5 FL (ref 80–100)
MONOCYTES NFR BLD AUTO: 5.4 % (ref 1.7–9.3)
PLATELET # BLD AUTO: 292 K/UL (ref 140–450)
RBC # BLD AUTO: 5.66 X10/CMM (ref 4.2–5.9)
WBC # BLD AUTO: 6.4 X10/CMM (ref 3.8–11)

## 2020-04-01 PROCEDURE — 85025 COMPLETE CBC W/AUTO DIFF WBC: CPT | Performed by: FAMILY MEDICINE

## 2020-04-01 PROCEDURE — 10060 I&D ABSCESS SIMPLE/SINGLE: CPT | Performed by: FAMILY MEDICINE

## 2020-04-01 PROCEDURE — 36415 COLL VENOUS BLD VENIPUNCTURE: CPT | Performed by: FAMILY MEDICINE

## 2020-04-01 PROCEDURE — 80053 COMPREHEN METABOLIC PANEL: CPT | Mod: 90 | Performed by: FAMILY MEDICINE

## 2020-04-01 PROCEDURE — 99202 OFFICE O/P NEW SF 15 MIN: CPT | Mod: 25 | Performed by: FAMILY MEDICINE

## 2020-04-01 PROCEDURE — 90471 IMMUNIZATION ADMIN: CPT | Performed by: FAMILY MEDICINE

## 2020-04-01 PROCEDURE — 99386 PREV VISIT NEW AGE 40-64: CPT | Mod: 59 | Performed by: FAMILY MEDICINE

## 2020-04-01 PROCEDURE — 90715 TDAP VACCINE 7 YRS/> IM: CPT | Performed by: FAMILY MEDICINE

## 2020-04-01 PROCEDURE — 80061 LIPID PANEL: CPT | Mod: 90 | Performed by: FAMILY MEDICINE

## 2020-04-01 PROCEDURE — 93000 ELECTROCARDIOGRAM COMPLETE: CPT | Performed by: FAMILY MEDICINE

## 2020-04-01 RX ORDER — SULFAMETHOXAZOLE/TRIMETHOPRIM 800-160 MG
1 TABLET ORAL 2 TIMES DAILY
Qty: 14 TABLET | Refills: 0 | Status: SHIPPED | OUTPATIENT
Start: 2020-04-01 | End: 2020-07-24

## 2020-04-01 SDOH — HEALTH STABILITY: MENTAL HEALTH: HOW OFTEN DO YOU HAVE A DRINK CONTAINING ALCOHOL?: 2-4 TIMES A MONTH

## 2020-04-01 ASSESSMENT — MIFFLIN-ST. JEOR: SCORE: 1601.98

## 2020-04-01 NOTE — LETTER
April 3, 2020      Obey Richardson  [address] [facility] [address]        Dear ,    We are writing to inform you of your test results.    {results letter list:[id number]}    Resulted Orders   Lipid Panel ([facility])   Result Value Ref Range    Cholesterol 192 100 - 199 mg/dL    Triglycerides 101 0 - 149 mg/dL    HDL Cholesterol 52 >39 mg/dL    VLDL Cholesterol Lang 20 5 - 40 mg/dL    LDL Cholesterol Calculated 120 (H) 0 - 99 mg/dL    LDL/HDL Ratio 2.3 0.0 - 3.6 ratio      Comment:                                          LDL/HDL Ratio                                              Men  Women                                1/2 Avg.Risk  1.0    1.5                                    Avg.Risk  3.6    3.2                                 2X Avg.Risk  6.2    5.0                                 3X Avg.Risk  8.0    6.1      Narrative    Performed at:  01 - [facility] [address], [place], [place]  [id number]  : Zain Christina MD, Phone:  [phone]   CBC with Diff/Plt ([facility])   Result Value Ref Range    WBC x10/cmm 6.4 3.8 - 11.0 x10/cmm    % Lymphocytes 20.9 20.5 - 51.1 %    % Monocytes 5.4 1.7 - 9.3 %    % Granulocytes 73.7 42.2 - 75.2 %    RBC x10/cmm 5.66 4.2 - 5.9 x10/cmm    Hemoglobin 16.5 13.4 - 17.5 g/dl    Hematocrit 47.9 39 - 51 %    MCV 84.5 80 - 100 fL    MCH 29.2 27.0 - 31.0 pg    MCHC 34.5 33.0 - 37.0 g/dL    Platelet Count 292 140 - 450 K/uL   Comp. Metabolic Panel (14) ([facility])   Result Value Ref Range    Glucose 95 65 - 99 mg/dL    Urea Nitrogen 10 6 - 24 mg/dL    Creatinine 1.10 0.76 - 1.27 mg/dL    eGFR If NonAfricn Am 78 >59 mL/min/1.73    eGFR If Africn Am 90 >59 mL/min/1.73    BUN/Creatinine Ratio 9 9 - 20    Sodium 139 134 - 144 mmol/L    Potassium 4.5 3.5 - 5.2 mmol/L    Chloride 100 96 - 106 mmol/L    Total CO2 30 (H) 20 - 29 mmol/L    Calcium 9.8 8.7 - 10.2 mg/dL    Protein Total 6.9 6.0 - 8.5 g/dL    Albumin 4.5 4.0 - 5.0 g/dL    Globulin, Total 2.4 1.5 - 4.5 g/dL    A/G  Ratio 1.9 1.2 - 2.2    Bilirubin Total 0.7 0.0 - 1.2 mg/dL    Alkaline Phosphatase 89 39 - 117 IU/L    AST 15 0 - 40 IU/L    ALT 14 0 - 44 IU/L    Narrative    Performed at:  01 - [facility] [address], [place], [place]  [id number]  : Zain Christina MD, Phone:  [phone]       If you have any questions or concerns, please call the clinic at the number listed above.       Sincerely,        Mario Mcleod MD

## 2020-04-01 NOTE — PROGRESS NOTES
SUBJECTIVE:   CC: Obey Richardson is an 49 year old male who presents for preventative health visit.     HPI    49 year old presents for evaluation of skin lesion.  Has not had CPE in many years.  Would like to have CPE today as well.    Has had lump on left side for a long time.  Has increased in size past 3 weeks.  Over past week increased redness and pain.  No drainage, fever, chills.    Abuse: Current or Past(Physical, Sexual or Emotional)- No  Do you feel safe in your environment? Yes        Social History     Tobacco Use     Smoking status: Former Smoker     Smokeless tobacco: Never Used     Tobacco comment: was social smoker   Substance Use Topics     Alcohol use: Yes     Comment: occ     If you drink alcohol do you typically have >3 drinks per day or >7 drinks per week? No    No flowsheet data found.No flowsheet data found.    Last PSA: No results found for: PSA    Reviewed orders with patient. Reviewed health maintenance and updated orders accordingly - Yes      Reviewed and updated as needed this visit by clinical staff  Tobacco  Allergies  Meds         Reviewed and updated as needed this visit by Provider        Past Medical History:   Diagnosis Date     Kidney stones         Review of Systems  CONSTITUTIONAL: NEGATIVE for fever, chills, change in weight  EYES: NEGATIVE for vision changes or irritation  ENT: NEGATIVE for ear, mouth and throat problems  RESP: NEGATIVE for significant cough or SOB  CV: NEGATIVE for chest pain, palpitations or peripheral edema  GI: NEGATIVE for nausea, abdominal pain, heartburn, or change in bowel habits   male: negative for dysuria, hematuria, decreased urinary stream, erectile dysfunction, urethral discharge  MUSCULOSKELETAL: NEGATIVE for significant arthralgias or myalgia  NEURO: NEGATIVE for weakness, dizziness or paresthesias  PSYCHIATRIC: NEGATIVE for changes in mood or affect    OBJECTIVE:   /72   Pulse 64   Temp 97.6  F (36.4  C) (Oral)   Resp 16    Ht 1.829 m (6')   Wt 70.4 kg (155 lb 3.2 oz)   BMI 21.05 kg/m      Physical Exam  GENERAL: healthy, alert and no distress  EYES: Eyes grossly normal to inspection, PERRL and conjunctivae and sclerae normal  HENT: ear canals and TM's normal, nose and mouth without ulcers or lesions  NECK: no adenopathy, no asymmetry, masses, or scars and thyroid normal to palpation  RESP: lungs clear to auscultation - no rales, rhonchi or wheezes  CV: regular rate and rhythm, normal S1 S2, no S3 or S4, no murmur, click or rub, no peripheral edema and peripheral pulses strong  ABDOMEN: soft, nontender, no hepatosplenomegaly, no masses and bowel sounds normal  RECTAL: normal sphincter tone, no rectal masses, prostate normal size, smooth, nontender without nodules or masses  MS: no gross musculoskeletal defects noted, no edema  SKIN: enlarged erythematous fluctuant mass left side measuring approximately 4 x 4 cm.  NEURO: Normal strength and tone, mentation intact and speech normal  PSYCH: mentation appears normal, affect normal/bright  LYMPH: no cervical, supraclavicular, axillary, or inguinal adenopathy    Diagnostic Test Results:  Labs reviewed in Epic  Results for orders placed or performed in visit on 04/01/20 (from the past 24 hour(s))   CBC with Diff/Plt ([facility])   Result Value Ref Range    WBC x10/cmm 6.4 3.8 - 11.0 x10/cmm    % Lymphocytes 20.9 20.5 - 51.1 %    % Monocytes 5.4 1.7 - 9.3 %    % Granulocytes 73.7 42.2 - 75.2 %    RBC x10/cmm 5.66 4.2 - 5.9 x10/cmm    Hemoglobin 16.5 13.4 - 17.5 g/dl    Hematocrit 47.9 39 - 51 %    MCV 84.5 80 - 100 fL    MCH 29.2 27.0 - 31.0 pg    MCHC 34.5 33.0 - 37.0 g/dL    Platelet Count 292 140 - 450 K/uL     EKG: NSR, IRBBB.  No ST or T wave changes.  No previous    ASSESSMENT/PLAN:   (Z00.00) Health care maintenance  (primary encounter diagnosis)  Comment: doing well, discussed healthy lifestyle, exercise, preventative health  Plan: TDAP VACCINE, Lipid Panel ([facility]),          GASTROENTEROLOGY ADULT REF PROCEDURE ONLY, CBC         with Diff/Plt ([facility]), Comp. Metabolic Panel (14)        ([facility]), EKG 12-lead complete w/read -         Clinics            (L02.219) Abscess, trunk  Comment: see procedure above.  Plan: TDAP VACCINE          --Due to size and amount of erythema will cover with Bactrim  --remove packing in 2 days, patient to do at home  --reasons to follow up discussed and understood        COUNSELING:   Reviewed preventive health counseling, as reflected in patient instructions       Regular exercise       Healthy diet/nutrition       Immunizations    Vaccinated for: TDAP             Colon cancer screening    Estimated body mass index is 21.05 kg/m  as calculated from the following:    Height as of this encounter: 1.829 m (6').    Weight as of this encounter: 70.4 kg (155 lb 3.2 oz).          reports that he has quit smoking. He has never used smokeless tobacco.      Counseling Resources:  ATP IV Guidelines  Pooled Cohorts Equation Calculator  FRAX Risk Assessment  ICSI Preventive Guidelines  Dietary Guidelines for Americans, 2010  USDA's MyPlate  ASA Prophylaxis  Lung CA Screening    Mario Mcleod MD  [facility]

## 2020-04-01 NOTE — PATIENT INSTRUCTIONS
Patient Education        Patient Education     Prevention Guidelines, Men Ages 50 to 64  Screening tests and vaccines are an important part of managing your health. A screening test is done to find possible disorders or diseases in people who don't have any symptoms. The goal is to find a disease early so lifestyle changes can be made and you can be watched more closely to reduce the risk of disease, or to detect it early enough to treat it most effectively. Screening tests are not considered diagnostic, but are used to determine if more testing is needed. Health counseling is essential, too. Below are guidelines for these, for men ages 50 to 64. Talk with your healthcare provider to make sure you re up-to-date on what you need.  Screening Who needs it How often   Alcohol misuse All men in this age group At routine exams   Blood pressure All men in this age group Yearly checkup if your blood pressure is normal  Normal blood pressure is less than 120/80 mm Hg  If your blood pressure reading is higher than normal, follow the advice of your healthcare provider      Colorectal cancer All men in this age group Flexible sigmoidoscopy every 5 years, or colonoscopy every 10 years, or double-contrast barium enema every 5 years; yearly fecal occult blood test or fecal immunochemical test; or a stool DNA test as often as your healthcare provider advises; talk with your healthcare provider about which tests are best for you   Depression All men in this age group At routine exams   Type 2 diabetes or prediabetes All men beginning at age 45 and men without symptoms at any age who are overweight or obese and have 1 or more other risk factors for diabetes At least every 3 years (yearly if your blood sugar has already begun to rise)   Type 2 diabetes All men with prediabetes Every year   Hepatitis C Men at increased risk for infection - talk with your healthcare provider At routine exams. All men ages 50 to 70 should be tested at  least once for hepatitis C.   High cholesterol or triglycerides All men in this age group At least every 5 years   HIV Men at increased risk for infection - talk with your healthcare provider At routine exams   Lung cancer Adults age 55 to 80 who have smoked Yearly screening in smokers with 30 pack-year history of smoking or who quit within 15 years   Obesity All men in this age group At routine exams   Prostate cancer Starting at age 45, talk to healthcare provider about risks and benefits of digital rectal exam (JIGAR) and prostate-specific antigen (PSA) screening1 At routine exams   Syphilis Men at increased risk for infection - talk with your healthcare provider At routine exams   Tuberculosis Men at increased risk for infection - talk with your healthcare provider Ask your healthcare provider   Vision All men in this age group Ask your healthcare provider   Vaccine Who needs it How often   Chickenpox (varicella) All men in this age group who have no record of this infection or vaccine 2 doses; second dose should be given at least 4 weeks after the first dose   Hepatitis A Men at increased risk for infection - talk with your healthcare provider 2 doses given at least 6 months apart   Hepatitis B Men at increased risk for infection - talk with your healthcare provider 3 doses over 6 months; second dose should be given 1 month after the first dose; the third dose should be given at least 2 months after the second dose and at least 4 months after the first dose   Haemophilus influenzae Type B (HIB) Men at increased risk for infection - talk with your healthcare provider 1 to 3 doses   Influenza (flu) All men in this age group Once a year   Measles, mumps, rubella (MMR) Men in this age group through their late 50s who have no record of these infections or vaccines 1 or 2 doses; ask your healthcare provider   Meningococcal Men at increased risk for infection - talk with your healthcare provider 1 or more doses    Pneumococcal conjugate vaccine (PCV13) and pneumococcal polysaccharide vaccine (PPSV23) Men at increased risk for infection - talk with your healthcare provider PCV13: 1 dose ages 19 to 65 (protects against 13 types of pneumococcal bacteria)     PPSV23: 1 to 2 doses through age 64, or 1 dose at 65 or older (protects against 23 types of pneumococcal bacteria)      Tetanus/diphtheria/  pertussis (Td/Tdap) booster All men in this age group Td every 10 years, or a one-time dose of Tdap instead of a Td booster after age 18, then Td every 10 years   Zoster All men ages 60 and older 1 dose   Counseling Who needs it How often   Diet and exercise Men who are overweight or obese When diagnosed, and then at routine exams   Sexually transmitted infection prevention Men at increased risk for infection - talk with your healthcare provider At routine exams   Use of daily aspirin Men in this age group at risk for cardiovascular health problems At routine exams   Use of tobacco and the health effects it can cause All men in this age group Every visit   [address] [place] [address] [facility]  Date Last Reviewed: 2/1/2017 2000-2019 [employer]. [address] [place] [address]. All rights reserved. This information is not intended as a substitute for professional medical care. Always follow your healthcare professional's instructions.           Patient Education     Abscess (Incision & Drainage)  An abscess is sometimes called a boil. It happens when bacteria get trapped under the skin and start to grow. Pus forms inside the abscess as the body responds to the bacteria. An abscess can happen with an insect bite, ingrown hair, blocked oil gland, pimple, cyst, or puncture wound.  Your healthcare provider has drained the pus from your abscess. If the abscess pocket was large, your healthcare provider may have put in gauze packing. Your provider will need to remove it on your next visit. He or she may also  replace it at that time. You may not need antibiotics to treat a simple abscess, unless the infection is spreading into the skin around the wound (cellulitis).  The wound will take about 1 to 2 weeks to heal, depending on the size of the abscess. Healthy tissue will grow from the bottom and sides of the opening until it seals over.  Home care  These tips can help your wound heal:    The wound may drain for the first 2 days. Cover the wound with a clean dry dressing. Change the dressing if it becomes soaked with blood or pus.    If a gauze packing was placed inside the abscess pocket, you may be told to remove it yourself. You may do this in the shower. Once the packing is removed, you should wash the area in the shower, or clean the area as directed by your provider. Continue to do this until the skin opening has closed. Make sure you wash your hands after changing the packing or cleaning the wound.    If you were prescribed antibiotics, take them as directed until they are all gone.    You may use acetaminophen or ibuprofen to control pain, unless another pain medicine was prescribed. If you have liver disease or ever had a stomach ulcer, talk with your doctor before using these medicines.  Follow-up care  Follow up with your healthcare provider, or as advised. If a gauze packing was put in your wound, it should be removed in 1 to 2 days. Check your wound every day for any signs that the infection is getting worse. The signs are listed below.  When to seek medical advice  Call your healthcare provider right away if any of these occur:    Increasing redness or swelling    Red streaks in the skin leading away from the wound    Increasing local pain or swelling    Continued pus draining from the wound 2 days after treatment    Fever of 100.4 F (38 C) or higher, or as directed by your healthcare provider    Boil returns when you are at home  Date Last Reviewed: 9/1/2016 2000-2019 [employer] [address]  [place] [address]. All rights reserved. This information is not intended as a substitute for professional medical care. Always follow your healthcare professional's instructions.

## 2020-04-02 LAB
ALBUMIN SERPL-MCNC: 4.5 G/DL (ref 4–5)
ALBUMIN/GLOB SERPL: 1.9 {RATIO} (ref 1.2–2.2)
ALP SERPL-CCNC: 89 IU/L (ref 39–117)
ALT SERPL-CCNC: 14 IU/L (ref 0–44)
AST SERPL-CCNC: 15 IU/L (ref 0–40)
BILIRUB SERPL-MCNC: 0.7 MG/DL (ref 0–1.2)
BUN SERPL-MCNC: 10 MG/DL (ref 6–24)
BUN/CREATININE RATIO: 9 (ref 9–20)
CALCIUM SERPL-MCNC: 9.8 MG/DL (ref 8.7–10.2)
CHLORIDE SERPLBLD-SCNC: 100 MMOL/L (ref 96–106)
CHOLEST SERPL-MCNC: 192 MG/DL (ref 100–199)
CREAT SERPL-MCNC: 1.1 MG/DL (ref 0.76–1.27)
EGFR IF AFRICN AM: 90 ML/MIN/1.73
EGFR IF NONAFRICN AM: 78 ML/MIN/1.73
GLOBULIN, TOTAL: 2.4 G/DL (ref 1.5–4.5)
GLUCOSE SERPL-MCNC: 95 MG/DL (ref 65–99)
HDLC SERPL-MCNC: 52 MG/DL
LDL/HDL RATIO: 2.3 RATIO (ref 0–3.6)
LDLC SERPL CALC-MCNC: 120 MG/DL (ref 0–99)
POTASSIUM SERPL-SCNC: 4.5 MMOL/L (ref 3.5–5.2)
PROT SERPL-MCNC: 6.9 G/DL (ref 6–8.5)
SODIUM SERPL-SCNC: 139 MMOL/L (ref 134–144)
TOTAL CO2: 30 MMOL/L (ref 20–29)
TRIGL SERPL-MCNC: 101 MG/DL (ref 0–149)
VLDLC SERPL CALC-MCNC: 20 MG/DL (ref 5–40)

## 2020-07-24 ENCOUNTER — OFFICE VISIT (OUTPATIENT)
Dept: FAMILY MEDICINE | Facility: CLINIC | Age: 50
End: 2020-07-24

## 2020-07-24 VITALS
OXYGEN SATURATION: 96 % | WEIGHT: 158 LBS | SYSTOLIC BLOOD PRESSURE: 130 MMHG | DIASTOLIC BLOOD PRESSURE: 84 MMHG | TEMPERATURE: 98.9 F | HEART RATE: 55 BPM | BODY MASS INDEX: 21.43 KG/M2

## 2020-07-24 DIAGNOSIS — L81.0 POSTINFLAMMATORY HYPERPIGMENTATION: Primary | ICD-10-CM

## 2020-07-24 PROCEDURE — 99213 OFFICE O/P EST LOW 20 MIN: CPT | Performed by: FAMILY MEDICINE

## 2020-07-24 RX ORDER — TRIAMCINOLONE ACETONIDE 1 MG/G
OINTMENT TOPICAL 2 TIMES DAILY
Qty: 30 G | Refills: 0 | Status: SHIPPED | OUTPATIENT
Start: 2020-07-24 | End: 2020-08-03

## 2020-07-24 NOTE — PROGRESS NOTES
SUBJECTIVE:                                                    Obey Richardson is a 49 year old male who presents to clinic today for evaluation.  During his physical in April, I drained an abscess on his left side.  It did well but noticed the area was a bit red a couple weeks ago and started applying triple antibiotic ointment.  It has become slightly itchy.  No swelling, pain or drainage.      Problem list, Medication list, Allergies, and Medical/Social/Surgical histories reviewed in [facility] and updated as appropriate.   Additional history: as documented    ROS:    A 5 system review was completed and is as noted in HPI and otherwise negative.      Histories:   There is no problem list on file for this patient.    Past Surgical History:   Procedure Laterality Date     SOFT TISSUE SURGERY      birth josh removed from right forearm        Social History     Tobacco Use     Smoking status: Former Smoker     Smokeless tobacco: Never Used     Tobacco comment: was social smoker   Substance Use Topics     Alcohol use: Yes     Frequency: 2-4 times a month     Comment: occ     Family History   Problem Relation Age of Onset     No Known Problems Mother      No Known Problems Sister      No Known Problems Brother      No Known Problems Sister      No Known Problems Brother      No Known Problems Brother      No Known Problems Brother            OBJECTIVE:                                                    /84   Pulse 55   Temp 98.9  F (37.2  C)   Wt 71.7 kg (158 lb)   SpO2 96%   BMI 21.43 kg/m    Body mass index is 21.43 kg/m .     General: Well appearing, NAD  Oropharynx: Clear  Skin: Small well healed scar left side of chest.  Mild hyperpigmentation and slight erythema without swelling, fluctuance  Psych: Normal mood and affect         ASSESSMENT/PLAN:                                                        Postinflammatory hyperpigmentation: discussed the area overall looks very good.  There is no evidence of  infection.  I suspect he has developed a slight sensitivity to the abx ointment.  rec stopping this and brief course of TAC ointment.  Follow up if not improved  -     triamcinolone (KENALOG) 0.1 % external ointment; Apply topically 2 times daily for 10 days        The following health maintenance items are reviewed in Epic and correct as of today:  Health Maintenance   Topic Date Due     COLORECTAL CANCER SCREENING  11/28/1979     HIV SCREENING  11/28/1984     PHQ-2  01/01/2020     ZOSTER IMMUNIZATION (1 of 2) 11/28/2019     INFLUENZA VACCINE (1) 09/01/2020     PREVENTIVE CARE VISIT  04/01/2021     LIPID  04/01/2025     DTAP/TDAP/TD IMMUNIZATION (2 - Td) 04/01/2030     IPV IMMUNIZATION  Aged Out     MENINGITIS IMMUNIZATION  Aged Out     HEPATITIS B IMMUNIZATION  Aged Out         Mario Mcleod MD  [facility]

## 2023-08-13 NOTE — LETTER
[facility] [address] [place]  [address]  Phone: [phone]    April 3, 2020      Obey Helio Richardson  [address] [facility] [address]              Dear Obey,    Your labs are all in the normal range.  Your LDL (bad cholesterol) appears as though it is elevated but it is normal for your age and risk factors.  Everything else looks good.     It was very nice meeting you.     Please keep me posted if you have any concerns about your abscess and how it is healing.  You should follow up in 1 year, sooner if needed.     Sincerely,     Mario Mcleod MD    Results for orders placed or performed in visit on 04/01/20   Lipid Panel ([facility])     Status: Abnormal   Result Value Ref Range    Cholesterol 192 100 - 199 mg/dL    Triglycerides 101 0 - 149 mg/dL    HDL Cholesterol 52 >39 mg/dL    VLDL Cholesterol Lang 20 5 - 40 mg/dL    LDL Cholesterol Calculated 120 (H) 0 - 99 mg/dL    LDL/HDL Ratio 2.3 0.0 - 3.6 ratio    Narrative    Performed at:  01 - [facility] [address], [place], [place]  [id number]  : Zain Christina MD, Phone:  [phone]   CBC with Diff/Plt ([facility])     Status: None   Result Value Ref Range    WBC x10/cmm 6.4 3.8 - 11.0 x10/cmm    % Lymphocytes 20.9 20.5 - 51.1 %    % Monocytes 5.4 1.7 - 9.3 %    % Granulocytes 73.7 42.2 - 75.2 %    RBC x10/cmm 5.66 4.2 - 5.9 x10/cmm    Hemoglobin 16.5 13.4 - 17.5 g/dl    Hematocrit 47.9 39 - 51 %    MCV 84.5 80 - 100 fL    MCH 29.2 27.0 - 31.0 pg    MCHC 34.5 33.0 - 37.0 g/dL    Platelet Count 292 140 - 450 K/uL   Comp. Metabolic Panel (14) ([facility])     Status: Abnormal   Result Value Ref Range    Glucose 95 65 - 99 mg/dL    Urea Nitrogen 10 6 - 24 mg/dL    Creatinine 1.10 0.76 - 1.27 mg/dL    eGFR If NonAfricn Am 78 >59 mL/min/1.73    eGFR If Africn Am 90 >59 mL/min/1.73    BUN/Creatinine Ratio 9 9 - 20    Sodium 139 134 - 144 mmol/L    Potassium 4.5 3.5 - 5.2 mmol/L    Chloride 100 96 - 106 mmol/L    Total CO2 30  (H) 20 - 29 mmol/L    Calcium 9.8 8.7 - 10.2 mg/dL    Protein Total 6.9 6.0 - 8.5 g/dL    Albumin 4.5 4.0 - 5.0 g/dL    Globulin, Total 2.4 1.5 - 4.5 g/dL    A/G Ratio 1.9 1.2 - 2.2    Bilirubin Total 0.7 0.0 - 1.2 mg/dL    Alkaline Phosphatase 89 39 - 117 IU/L    AST 15 0 - 40 IU/L    ALT 14 0 - 44 IU/L    Narrative    Performed at:  01 - [facility]  [address] [place], [place]  [id number]  : Zain Christina MD, Phone:  [phone]       None